# Patient Record
Sex: MALE | Race: WHITE | Employment: FULL TIME | URBAN - METROPOLITAN AREA
[De-identification: names, ages, dates, MRNs, and addresses within clinical notes are randomized per-mention and may not be internally consistent; named-entity substitution may affect disease eponyms.]

---

## 2024-09-04 NOTE — PROGRESS NOTES
Patient reached ____ yes  _X____ no   VM instructions left _X___ yes   phone number [phone]      Date _9/11/24________  Time __0930_____  Arrival __0800  FEC_    Nothing to eat or drink after midnight-follow your doctors prep instructions-this may include taking a second dose of your prep after midnight  Responsible adult 18 or older to stay on site while you are here-drive you home-stay with you after  Follow any instructions your doctors office has given you  Bring a complete list of all your medications and supplements including name,dose,how often taken the day of your procedure  If you normally take the following medications in the morning please do so the AM of your procedure with a small sip of water       Heart,blood pressure,seizure,thyroid or breathing medications-use your inhalers-bring any rescue inhalers with you DOS       DO NOT take blood pressure medications ending in \"vasile\" or \"pril\" the AM of procedure or evening prior  Dr Celestin patients are not to take any medications the AM of surgery  Take half or your normal dose of any long acting insulins the night before your procedure-do not take any diabetic medications the AM of procedure. If you take a weekly injection for diabetes or weight loss-do not take one week prior to surgery/procedure.If you have already taken your injection this week,contact your surgeon  Follow your doctors instructions regarding stopping or taking  any blood thinners-if you do not have instructions-call them  Any questions call your doctor  Other ______________________________________________________________      VISITOR POLICY(subject to change)             The current policy is 2 visitors per patient.There are no children allowed.Mask at discretion of facility. Visiting hours are 8a-8p.Overnight visitors will be at the discretion of the nurse. All policies are subject to change.

## 2024-09-11 ENCOUNTER — HOSPITAL ENCOUNTER (OUTPATIENT)
Age: 49
Setting detail: OUTPATIENT SURGERY
Discharge: HOME | End: 2024-09-11
Attending: INTERNAL MEDICINE | Admitting: INTERNAL MEDICINE
Payer: COMMERCIAL

## 2024-09-11 ENCOUNTER — ANESTHESIA EVENT (OUTPATIENT)
Dept: ENDOSCOPY | Age: 49
End: 2024-09-11
Payer: COMMERCIAL

## 2024-09-11 ENCOUNTER — ANESTHESIA (OUTPATIENT)
Dept: ENDOSCOPY | Age: 49
End: 2024-09-11
Payer: COMMERCIAL

## 2024-09-11 VITALS
DIASTOLIC BLOOD PRESSURE: 86 MMHG | RESPIRATION RATE: 16 BRPM | WEIGHT: 205 LBS | OXYGEN SATURATION: 97 % | SYSTOLIC BLOOD PRESSURE: 125 MMHG | TEMPERATURE: 97.5 F | BODY MASS INDEX: 27.77 KG/M2 | HEIGHT: 72 IN | HEART RATE: 64 BPM

## 2024-09-11 DIAGNOSIS — Z12.11 COLON CANCER SCREENING: ICD-10-CM

## 2024-09-11 PROCEDURE — 3700000000 HC ANESTHESIA ATTENDED CARE: Performed by: INTERNAL MEDICINE

## 2024-09-11 PROCEDURE — 7100000010 HC PHASE II RECOVERY - FIRST 15 MIN: Performed by: INTERNAL MEDICINE

## 2024-09-11 PROCEDURE — 2580000003 HC RX 258: Performed by: ANESTHESIOLOGY

## 2024-09-11 PROCEDURE — 6360000002 HC RX W HCPCS: Performed by: NURSE ANESTHETIST, CERTIFIED REGISTERED

## 2024-09-11 PROCEDURE — 3609010600 HC COLONOSCOPY POLYPECTOMY SNARE/COLD BIOPSY: Performed by: INTERNAL MEDICINE

## 2024-09-11 PROCEDURE — 2709999900 HC NON-CHARGEABLE SUPPLY: Performed by: INTERNAL MEDICINE

## 2024-09-11 PROCEDURE — 3700000001 HC ADD 15 MINUTES (ANESTHESIA): Performed by: INTERNAL MEDICINE

## 2024-09-11 PROCEDURE — 2500000003 HC RX 250 WO HCPCS: Performed by: NURSE ANESTHETIST, CERTIFIED REGISTERED

## 2024-09-11 PROCEDURE — 7100000011 HC PHASE II RECOVERY - ADDTL 15 MIN: Performed by: INTERNAL MEDICINE

## 2024-09-11 RX ORDER — HYDROCHLOROTHIAZIDE 12.5 MG/1
12.5 CAPSULE ORAL DAILY
COMMUNITY

## 2024-09-11 RX ORDER — AMLODIPINE BESYLATE 2.5 MG/1
2.5 TABLET ORAL DAILY
COMMUNITY

## 2024-09-11 RX ORDER — LIDOCAINE HYDROCHLORIDE 20 MG/ML
INJECTION, SOLUTION EPIDURAL; INFILTRATION; INTRACAUDAL; PERINEURAL PRN
Status: DISCONTINUED | OUTPATIENT
Start: 2024-09-11 | End: 2024-09-11 | Stop reason: SDUPTHER

## 2024-09-11 RX ORDER — LOSARTAN POTASSIUM 100 MG/1
100 TABLET ORAL DAILY
COMMUNITY

## 2024-09-11 RX ORDER — PROPOFOL 10 MG/ML
INJECTION, EMULSION INTRAVENOUS PRN
Status: DISCONTINUED | OUTPATIENT
Start: 2024-09-11 | End: 2024-09-11 | Stop reason: SDUPTHER

## 2024-09-11 RX ORDER — SODIUM CHLORIDE 9 MG/ML
INJECTION, SOLUTION INTRAVENOUS CONTINUOUS
Status: DISCONTINUED | OUTPATIENT
Start: 2024-09-11 | End: 2024-09-11 | Stop reason: HOSPADM

## 2024-09-11 RX ADMIN — PROPOFOL 100 MG: 10 INJECTION, EMULSION INTRAVENOUS at 09:10

## 2024-09-11 RX ADMIN — PROPOFOL 100 MG: 10 INJECTION, EMULSION INTRAVENOUS at 09:08

## 2024-09-11 RX ADMIN — PROPOFOL 100 MG: 10 INJECTION, EMULSION INTRAVENOUS at 09:16

## 2024-09-11 RX ADMIN — SODIUM CHLORIDE: 9 INJECTION, SOLUTION INTRAVENOUS at 08:25

## 2024-09-11 RX ADMIN — LIDOCAINE HYDROCHLORIDE 100 MG: 20 INJECTION, SOLUTION EPIDURAL; INFILTRATION; INTRACAUDAL; PERINEURAL at 09:08

## 2024-09-11 RX ADMIN — PROPOFOL 100 MG: 10 INJECTION, EMULSION INTRAVENOUS at 09:13

## 2024-09-11 RX ADMIN — PROPOFOL 50 MG: 10 INJECTION, EMULSION INTRAVENOUS at 09:15

## 2024-09-11 RX ADMIN — PROPOFOL 50 MG: 10 INJECTION, EMULSION INTRAVENOUS at 09:24

## 2024-09-11 RX ADMIN — PROPOFOL 50 MG: 10 INJECTION, EMULSION INTRAVENOUS at 09:19

## 2024-09-11 RX ADMIN — PROPOFOL 50 MG: 10 INJECTION, EMULSION INTRAVENOUS at 09:22

## 2024-09-11 ASSESSMENT — PAIN - FUNCTIONAL ASSESSMENT
PAIN_FUNCTIONAL_ASSESSMENT: NONE - DENIES PAIN

## 2024-09-11 NOTE — DISCHARGE INSTRUCTIONS
[facility] DISCHARGE INSTRUCTIONS    You may experience some lightheadedness for the next several hours.  Plan on quiet relaxation for the rest of today. Nap for four hours following procedure if possible.  A responsible adult needs to stay with you today.    Eat bland food and avoid anything greasy or spicy initially-progress to your normal diet gradually.  Diet restrictions as instructed.  You may resume home medications as instructed.    It is not unusual to experience some mild cramping or gas pains, and you may not have a bowel movement for several days.  If you had a polyp removed, avoid strenuous activity for 48 hours.  Avoid the use of aspirin or related compounds for one week, unless otherwise instructed by your physician.    You may notice a small amount of blood in your next few bowel movements, but if a large amount passes, call your physician.    If you have any of the following problems, notify your physician or return to the hospital emergency room : fever, chills, excessive bleeding, excessive vomiting, difficulty swallowing, uncontrolled pain, increased abdominal distention, shortness of breath or any other problems.    Call your doctor at [phone] if you have any concerns.     If you had any biopsies or polyps call for results in 14 business days.    See your physician's report for details about your procedure and recommendations.      ANESTHESIA DISCHARGE INSTRUCTIONS    Wear your seatbelt home.    You are under the influence of drugs-do not drink alcohol, drive ,operate machinery,or make any important decisions or sign any legal documentsfor 24 hours. You may resume normal activities tomorrow.  A responsible adult needs to be with you for 24 hours.  You may experience lightheadedness,dizziness,or sleepiness following surgery.    Rest at home today- increase activity as tolerated. It is recommended to take a four hour nap after procedure.  Progress slowly to a regular diet unless your

## 2024-09-11 NOTE — PROGRESS NOTES
Discharge instructions reviewed with patient/responsible adult. All home medications have been reviewed, questions answered and patient verbalized understanding.  Discharge instructions signed and copies given. Patient discharged ambulatory to home with belongings.   Post-procedure education reviewed with patient and visitor, including resuming of medication regimen, and they verbalized understanding.

## 2024-09-11 NOTE — H&P
Gastroenterology Note                 Pre-operative History and Physical    Patient: Anthony Barber  : 1975  CSN:     History Obtained From:   Patient or guardian.      HISTORY OF PRESENT ILLNESS:    The patient is a 48 y.o. male here for Endoscopy.      Past Medical History:    Past Medical History:   Diagnosis Date    Hypertension      Past Surgical History:    History reviewed. No pertinent surgical history.  Medications Prior to Admission:   No current facility-administered medications on file prior to encounter.     Current Outpatient Medications on File Prior to Encounter   Medication Sig Dispense Refill    losartan (COZAAR) 100 MG tablet Take 1 tablet by mouth daily      hydroCHLOROthiazide 12.5 MG capsule Take 1 capsule by mouth daily      amLODIPine (NORVASC) 2.5 MG tablet Take 1 tablet by mouth daily          Allergies:  Patient has no known allergies.      Social History:   Social History     Tobacco Use    Smoking status: Never    Smokeless tobacco: Former     Quit date:    Substance Use Topics    Alcohol use: Not Currently     Family History:   History reviewed. No pertinent family history.    PHYSICAL EXAM:      BP (!) 160/98   Pulse 70   Temp 97.1 °F (36.2 °C) (Temporal)   Resp 18   Ht 1.829 m (6')   Wt 93 kg (205 lb)   SpO2 99%   BMI 27.80 kg/m²  I        Heart:  RRR, normal s1s2    Lungs:  CTA and normal effort    Abdomen:   Soft, nt nd.         ASSESSMENT AND PLAN:    1.  Patient is a 48 y.o. male here for endoscopy with MAC sedation.    2.  Procedure options, risks and benefits reviewed with patient and/or guardian.  They express understanding.

## (undated) DEVICE — AIR/WATER CLEANING ADAPTER FOR OLYMPUS® GI ENDOSCOPE: Brand: BULLDOG®

## (undated) DEVICE — TRAP SPEC RETRV CLR PLAS POLYP IN LN SUCT QUIK CTCH

## (undated) DEVICE — ENDOSCOPIC KIT 6X3/16 FT COLON W/ 1.1 OZ 2 GWN W/O BRSH

## (undated) DEVICE — SOLUTION IRRIG 500ML STRL H2O NONPYROGENIC

## (undated) DEVICE — SINGLE USE AIR/WATER, SUCTION AND BIOPSY VALVES SET: Brand: ORCAPOD™

## (undated) DEVICE — BW-412T DISP COMBO CLEANING BRUSH: Brand: SINGLE USE COMBINATION CLEANING BRUSH

## (undated) DEVICE — SNARE COLD DIAMOND 15MM THIN